# Patient Record
Sex: FEMALE | Race: WHITE | Employment: OTHER | URBAN - NONMETROPOLITAN AREA
[De-identification: names, ages, dates, MRNs, and addresses within clinical notes are randomized per-mention and may not be internally consistent; named-entity substitution may affect disease eponyms.]

---

## 2020-04-21 ENCOUNTER — APPOINTMENT (OUTPATIENT)
Dept: CT IMAGING | Age: 65
DRG: 092 | End: 2020-04-21
Payer: MEDICARE

## 2020-04-21 ENCOUNTER — APPOINTMENT (OUTPATIENT)
Dept: MRI IMAGING | Age: 65
DRG: 092 | End: 2020-04-21
Payer: MEDICARE

## 2020-04-21 ENCOUNTER — APPOINTMENT (OUTPATIENT)
Dept: GENERAL RADIOLOGY | Age: 65
DRG: 092 | End: 2020-04-21
Payer: MEDICARE

## 2020-04-21 ENCOUNTER — HOSPITAL ENCOUNTER (INPATIENT)
Age: 65
LOS: 1 days | Discharge: HOME | DRG: 092 | End: 2020-04-22
Attending: EMERGENCY MEDICINE | Admitting: HOSPITALIST
Payer: MEDICARE

## 2020-04-21 PROBLEM — R47.01 APHASIA: Status: ACTIVE | Noted: 2020-04-21

## 2020-04-21 PROBLEM — R03.0 BLOOD PRESSURE ELEVATED WITHOUT HISTORY OF HTN: Status: ACTIVE | Noted: 2020-04-21

## 2020-04-21 LAB
ALBUMIN SERPL-MCNC: 4.5 G/DL (ref 3.5–5.2)
ALP BLD-CCNC: 56 U/L (ref 35–104)
ALT SERPL-CCNC: 20 U/L (ref 5–33)
AMPHETAMINE SCREEN, URINE: NEGATIVE
ANION GAP SERPL CALCULATED.3IONS-SCNC: 15 MMOL/L (ref 7–19)
APTT: 29 SEC (ref 26–36.2)
AST SERPL-CCNC: 27 U/L (ref 5–32)
BARBITURATE SCREEN URINE: NEGATIVE
BASOPHILS ABSOLUTE: 0 K/UL (ref 0–0.2)
BASOPHILS RELATIVE PERCENT: 0.3 % (ref 0–1)
BENZODIAZEPINE SCREEN, URINE: NEGATIVE
BILIRUB SERPL-MCNC: 0.3 MG/DL (ref 0.2–1.2)
BILIRUBIN URINE: NEGATIVE
BLOOD, URINE: NEGATIVE
BUN BLDV-MCNC: 7 MG/DL (ref 8–23)
CALCIUM SERPL-MCNC: 9.4 MG/DL (ref 8.8–10.2)
CANNABINOID SCREEN URINE: NEGATIVE
CHLORIDE BLD-SCNC: 99 MMOL/L (ref 98–111)
CHOLESTEROL, TOTAL: 217 MG/DL (ref 160–199)
CLARITY: CLEAR
CO2: 23 MMOL/L (ref 22–29)
COCAINE METABOLITE SCREEN URINE: NEGATIVE
COLOR: YELLOW
CREAT SERPL-MCNC: 0.5 MG/DL (ref 0.5–0.9)
EKG P AXIS: 72 DEGREES
EKG P-R INTERVAL: 128 MS
EKG Q-T INTERVAL: 366 MS
EKG QRS DURATION: 88 MS
EKG QTC CALCULATION (BAZETT): 408 MS
EKG T AXIS: 66 DEGREES
EOSINOPHILS ABSOLUTE: 0 K/UL (ref 0–0.6)
EOSINOPHILS RELATIVE PERCENT: 0.2 % (ref 0–5)
ETHANOL: <10 MG/DL (ref 0–0.08)
GFR NON-AFRICAN AMERICAN: >60
GLUCOSE BLD-MCNC: 125 MG/DL (ref 74–109)
GLUCOSE URINE: NEGATIVE MG/DL
HBA1C MFR BLD: 5.4 % (ref 4–6)
HCT VFR BLD CALC: 37.8 % (ref 37–47)
HDLC SERPL-MCNC: 96 MG/DL (ref 65–121)
HEMOGLOBIN: 12.6 G/DL (ref 12–16)
IMMATURE GRANULOCYTES #: 0 K/UL
INR BLD: 0.95 (ref 0.88–1.18)
KETONES, URINE: NEGATIVE MG/DL
LDL CHOLESTEROL CALCULATED: 115 MG/DL
LEUKOCYTE ESTERASE, URINE: NEGATIVE
LYMPHOCYTES ABSOLUTE: 0.9 K/UL (ref 1.1–4.5)
LYMPHOCYTES RELATIVE PERCENT: 14 % (ref 20–40)
Lab: NORMAL
MCH RBC QN AUTO: 31.5 PG (ref 27–31)
MCHC RBC AUTO-ENTMCNC: 33.3 G/DL (ref 33–37)
MCV RBC AUTO: 94.5 FL (ref 81–99)
MONOCYTES ABSOLUTE: 0.4 K/UL (ref 0–0.9)
MONOCYTES RELATIVE PERCENT: 6.6 % (ref 0–10)
NEUTROPHILS ABSOLUTE: 4.8 K/UL (ref 1.5–7.5)
NEUTROPHILS RELATIVE PERCENT: 78.6 % (ref 50–65)
NITRITE, URINE: NEGATIVE
OPIATE SCREEN URINE: NEGATIVE
PDW BLD-RTO: 13.5 % (ref 11.5–14.5)
PH UA: 6.5 (ref 5–8)
PLATELET # BLD: 265 K/UL (ref 130–400)
PMV BLD AUTO: 9 FL (ref 9.4–12.3)
POTASSIUM REFLEX MAGNESIUM: 3.7 MMOL/L (ref 3.5–5)
PROTEIN UA: NEGATIVE MG/DL
PROTHROMBIN TIME: 12.6 SEC (ref 12–14.6)
RBC # BLD: 4 M/UL (ref 4.2–5.4)
SEDIMENTATION RATE, ERYTHROCYTE: 13 MM/HR (ref 0–25)
SODIUM BLD-SCNC: 137 MMOL/L (ref 136–145)
SPECIFIC GRAVITY UA: 1 (ref 1–1.03)
TOTAL PROTEIN: 7.7 G/DL (ref 6.6–8.7)
TRIGL SERPL-MCNC: 30 MG/DL (ref 0–149)
TROPONIN: <0.01 NG/ML (ref 0–0.03)
TSH SERPL DL<=0.05 MIU/L-ACNC: 2.86 UIU/ML (ref 0.27–4.2)
URINE REFLEX TO CULTURE: NORMAL
UROBILINOGEN, URINE: 0.2 E.U./DL
WBC # BLD: 6.1 K/UL (ref 4.8–10.8)

## 2020-04-21 PROCEDURE — 80061 LIPID PANEL: CPT

## 2020-04-21 PROCEDURE — G0480 DRUG TEST DEF 1-7 CLASSES: HCPCS

## 2020-04-21 PROCEDURE — 99999 PR OFFICE/OUTPT VISIT,PROCEDURE ONLY: CPT | Performed by: EMERGENCY MEDICINE

## 2020-04-21 PROCEDURE — 85730 THROMBOPLASTIN TIME PARTIAL: CPT

## 2020-04-21 PROCEDURE — 84484 ASSAY OF TROPONIN QUANT: CPT

## 2020-04-21 PROCEDURE — 6370000000 HC RX 637 (ALT 250 FOR IP): Performed by: PHYSICIAN ASSISTANT

## 2020-04-21 PROCEDURE — 99223 1ST HOSP IP/OBS HIGH 75: CPT | Performed by: PSYCHIATRY & NEUROLOGY

## 2020-04-21 PROCEDURE — 85610 PROTHROMBIN TIME: CPT

## 2020-04-21 PROCEDURE — 85025 COMPLETE CBC W/AUTO DIFF WBC: CPT

## 2020-04-21 PROCEDURE — 83036 HEMOGLOBIN GLYCOSYLATED A1C: CPT

## 2020-04-21 PROCEDURE — 99285 EMERGENCY DEPT VISIT HI MDM: CPT

## 2020-04-21 PROCEDURE — 81003 URINALYSIS AUTO W/O SCOPE: CPT

## 2020-04-21 PROCEDURE — 71045 X-RAY EXAM CHEST 1 VIEW: CPT

## 2020-04-21 PROCEDURE — 36415 COLL VENOUS BLD VENIPUNCTURE: CPT

## 2020-04-21 PROCEDURE — 1210000000 HC MED SURG R&B

## 2020-04-21 PROCEDURE — 93005 ELECTROCARDIOGRAM TRACING: CPT | Performed by: HOSPITALIST

## 2020-04-21 PROCEDURE — 80307 DRUG TEST PRSMV CHEM ANLYZR: CPT

## 2020-04-21 PROCEDURE — 80053 COMPREHEN METABOLIC PANEL: CPT

## 2020-04-21 PROCEDURE — 70496 CT ANGIOGRAPHY HEAD: CPT

## 2020-04-21 PROCEDURE — 70498 CT ANGIOGRAPHY NECK: CPT

## 2020-04-21 PROCEDURE — 95816 EEG AWAKE AND DROWSY: CPT | Performed by: PSYCHIATRY & NEUROLOGY

## 2020-04-21 PROCEDURE — 70450 CT HEAD/BRAIN W/O DYE: CPT

## 2020-04-21 PROCEDURE — 2580000003 HC RX 258: Performed by: PHYSICIAN ASSISTANT

## 2020-04-21 PROCEDURE — 93010 ELECTROCARDIOGRAM REPORT: CPT | Performed by: INTERNAL MEDICINE

## 2020-04-21 PROCEDURE — 84443 ASSAY THYROID STIM HORMONE: CPT

## 2020-04-21 PROCEDURE — 6360000004 HC RX CONTRAST MEDICATION: Performed by: PSYCHIATRY & NEUROLOGY

## 2020-04-21 PROCEDURE — 70551 MRI BRAIN STEM W/O DYE: CPT

## 2020-04-21 PROCEDURE — 85652 RBC SED RATE AUTOMATED: CPT

## 2020-04-21 PROCEDURE — 95816 EEG AWAKE AND DROWSY: CPT

## 2020-04-21 RX ORDER — ONDANSETRON 2 MG/ML
4 INJECTION INTRAMUSCULAR; INTRAVENOUS EVERY 6 HOURS PRN
Status: DISCONTINUED | OUTPATIENT
Start: 2020-04-21 | End: 2020-04-22 | Stop reason: HOSPADM

## 2020-04-21 RX ORDER — SODIUM CHLORIDE 0.9 % (FLUSH) 0.9 %
10 SYRINGE (ML) INJECTION PRN
Status: DISCONTINUED | OUTPATIENT
Start: 2020-04-21 | End: 2020-04-22 | Stop reason: HOSPADM

## 2020-04-21 RX ORDER — ASPIRIN 300 MG/1
300 SUPPOSITORY RECTAL DAILY
Status: DISCONTINUED | OUTPATIENT
Start: 2020-04-21 | End: 2020-04-22 | Stop reason: HOSPADM

## 2020-04-21 RX ORDER — SODIUM CHLORIDE 0.9 % (FLUSH) 0.9 %
10 SYRINGE (ML) INJECTION EVERY 12 HOURS SCHEDULED
Status: DISCONTINUED | OUTPATIENT
Start: 2020-04-21 | End: 2020-04-22 | Stop reason: HOSPADM

## 2020-04-21 RX ORDER — POLYETHYLENE GLYCOL 3350 17 G/17G
17 POWDER, FOR SOLUTION ORAL DAILY PRN
Status: DISCONTINUED | OUTPATIENT
Start: 2020-04-21 | End: 2020-04-22 | Stop reason: HOSPADM

## 2020-04-21 RX ORDER — OMEGA-3S/DHA/EPA/FISH OIL/D3 300MG-1000
400 CAPSULE ORAL DAILY
COMMUNITY

## 2020-04-21 RX ORDER — ASCORBIC ACID 500 MG
500 TABLET ORAL DAILY
COMMUNITY

## 2020-04-21 RX ORDER — ATORVASTATIN CALCIUM 40 MG/1
40 TABLET, FILM COATED ORAL NIGHTLY
Status: DISCONTINUED | OUTPATIENT
Start: 2020-04-21 | End: 2020-04-22 | Stop reason: HOSPADM

## 2020-04-21 RX ORDER — THIAMINE HYDROCHLORIDE 100 MG/ML
100 INJECTION, SOLUTION INTRAMUSCULAR; INTRAVENOUS DAILY
Status: DISCONTINUED | OUTPATIENT
Start: 2020-04-21 | End: 2020-04-22 | Stop reason: HOSPADM

## 2020-04-21 RX ORDER — CETIRIZINE HYDROCHLORIDE 10 MG/1
10 TABLET ORAL DAILY
COMMUNITY

## 2020-04-21 RX ORDER — ASPIRIN 81 MG/1
81 TABLET ORAL DAILY
Status: DISCONTINUED | OUTPATIENT
Start: 2020-04-21 | End: 2020-04-22 | Stop reason: HOSPADM

## 2020-04-21 RX ORDER — PROMETHAZINE HYDROCHLORIDE 12.5 MG/1
12.5 TABLET ORAL EVERY 6 HOURS PRN
Status: DISCONTINUED | OUTPATIENT
Start: 2020-04-21 | End: 2020-04-22 | Stop reason: HOSPADM

## 2020-04-21 RX ORDER — LABETALOL HYDROCHLORIDE 5 MG/ML
10 INJECTION, SOLUTION INTRAVENOUS EVERY 10 MIN PRN
Status: DISCONTINUED | OUTPATIENT
Start: 2020-04-21 | End: 2020-04-22 | Stop reason: HOSPADM

## 2020-04-21 RX ADMIN — SODIUM CHLORIDE, PRESERVATIVE FREE 10 ML: 5 INJECTION INTRAVENOUS at 10:50

## 2020-04-21 RX ADMIN — ATORVASTATIN CALCIUM 40 MG: 40 TABLET, FILM COATED ORAL at 21:20

## 2020-04-21 RX ADMIN — IOPAMIDOL 90 ML: 755 INJECTION, SOLUTION INTRAVENOUS at 11:28

## 2020-04-21 RX ADMIN — SODIUM CHLORIDE, PRESERVATIVE FREE 10 ML: 5 INJECTION INTRAVENOUS at 21:20

## 2020-04-21 ASSESSMENT — ENCOUNTER SYMPTOMS
PHOTOPHOBIA: 0
COUGH: 0
SHORTNESS OF BREATH: 0
BACK PAIN: 0
NAUSEA: 0
VOMITING: 0

## 2020-04-21 ASSESSMENT — PAIN SCALES - GENERAL: PAINLEVEL_OUTOF10: 0

## 2020-04-21 ASSESSMENT — PAIN SCALES - WONG BAKER: WONGBAKER_NUMERICALRESPONSE: 0

## 2020-04-21 NOTE — PROGRESS NOTES
The patient is off the floor for an MRI. Will return when the patient is available.         Electronically Signed By:  Jason Fox M.S., CCC-SLP  4/21/2020,10:26 AM.

## 2020-04-21 NOTE — CONSULTS
normal; no masses,  no organomegaly  Extremities: extremities normal, atraumatic, no cyanosis or edema      NEUROLOGIC EXAMINATION:  Neurologic Exam     Mental Status   Disoriented to city. Disoriented to year and date. Oriented to month. Speech: (Very quiet and pressured.)  Level of consciousness: alert  Able to name object. Able to repeat. She has rather bizarre behavior. She says some odd things. She says I am trying to confuse her. She is able to answer questions and follow simple commands but does so slowly and sometimes has to be asked twice. Cranial Nerves     CN II   Visual fields full to confrontation. CN III, IV, VI   Pupils are equal, round, and reactive to light. Extraocular motions are normal.   Nystagmus: none     CN VII   Facial expression full, symmetric.      CN VIII   Hearing: intact    CN IX, X   Palate: symmetric    CN XI   Right sternocleidomastoid strength: normal  Left sternocleidomastoid strength: normal  Right trapezius strength: normal  Left trapezius strength: normal    CN XII   Tongue: not atrophic  Fasciculations: absent  Tongue deviation: none    Motor Exam   Muscle bulk: normal  Overall muscle tone: normal  Right arm pronator drift: absent  Left arm pronator drift: absent    Strength   Right neck flexion: 5/5  Left neck flexion: 5/5  Right neck extension: 5/5  Left neck extension: 5/5  Right deltoid: 5/5  Left deltoid: 5/5  Right biceps: 5/5  Left biceps: 5/5  Right triceps: 5/5  Left triceps: 5/5  Right wrist flexion: 5/5  Left wrist flexion: 5/5  Right wrist extension: 5/5  Left wrist extension: 5/5  Right interossei: 5/5  Left interossei: 5/5  Right iliopsoas: 5/5  Left iliopsoas: 5/5  Right quadriceps: 5/5  Left quadriceps: 5/5  Right glutei: 5/5  Left glutei: 5/5  Right anterior tibial: 5/5  Left anterior tibial: 5/5  Right posterior tibial: 5/5  Left posterior tibial: 5/5  Right peroneal: 5/5  Left peroneal: 5/5  Right gastroc: 5/5  Left gastroc: 5/5    Sensory imaging the brain is performed without   contrast.   COMPARISON: CT head 4/21/2020   FINDINGS: There is no abnormal diffusion restriction to indicate acute   ischemia. No intracranial hemorrhage identified. Punctate   calcifications considered of the basal ganglia. The ventricles are   normal in size and configuration. There are no abnormal intra-axial   signal changes. No evidence of demyelinating process. No abnormal   extra-axial fluid collection. The cerebellar tonsils are positioned at   the foramen magnum. No sellar mass identified. Corpus callosum appears   intact. Limited assessment of the orbits and base of skull is unremarkable. Chronic mucosal thickening noted of the ethmoid sinuses. Normal flow   voids in the distal internal carotid and basilar arteries. Appropriate   flow void seen in the sagittal sinus.       Impression   1d. No acute intracranial abnormality. No diffusion restriction to   indicate acute ischemia. 2. Mild chronic mucosal thickening of the ethmoid sinuses     Narrative   XR CHEST PORTABLE 4/21/2020 6:00 AM   HISTORY: Altered mental status   COMPARISON: None. CXR: A single frontal view of the chest is performed. Findings: There are linear densities the right midlung along the course of the   minor fissure favorable for atelectasis and/or scarring. There is no   dense parenchymal consolidation. The cardia mediastinal contours are   normal. No pleural effusion or pneumothorax. No acute bony pathology.       Impression   1. Linear right midlung densities may relate to atelectasis and/or   scarring versus pleural thickening of the minor fissure. No dense   consolidation or radiographic evidence of edema. .   Signed by Dr Mango Luna on 4/21/2020 7:33 AM       IMPRESSION:  1. Encephalopathy, unusual, consider post infectious, or viral encephalitis. Her confusion, speech pattern. .. suggest luciano but no history of that and would be very odd to start at age 71 especially un precipitated. 2. Recent pneumonia      PLAN:  1. Labs  2. EEG  3. Hold Lovenox  [facility]  5. Anticipate LP tomorrow  6.  CTA head and neck, echocardiogram    Reid Da Silva M.D.

## 2020-04-21 NOTE — ED PROVIDER NOTES
status: None    Intimate partner violence     Fear of current or ex partner: None     Emotionally abused: None     Physically abused: None     Forced sexual activity: None   Other Topics Concern    None   Social History Narrative    None       SCREENINGS    Nelson Coma Scale  Eye Opening: Spontaneous  Best Verbal Response: Confused  Best Motor Response: Obeys commands  Queta Coma Scale Score: 14        PHYSICAL EXAM    (up to 7 for level 4, 8 or more for level 5)     ED Triage Vitals [04/21/20 0636]   BP Temp Temp src Pulse Resp SpO2 Height Weight   (!) 173/76 98.1 °F (36.7 °C) -- 84 20 93 % 5' 8\" (1.727 m) 200 lb (90.7 kg)       Physical Exam  Vitals signs and nursing note reviewed. Constitutional:       General: She is not in acute distress. Appearance: She is well-developed. She is not toxic-appearing or diaphoretic. HENT:      Head: Normocephalic and atraumatic. Eyes:      General: No scleral icterus. Right eye: No discharge. Left eye: No discharge. Pupils: Pupils are equal, round, and reactive to light. Neck:      Musculoskeletal: Normal range of motion. Cardiovascular:      Rate and Rhythm: Normal rate and regular rhythm. Pulses: Normal pulses. Heart sounds: Normal heart sounds. Pulmonary:      Effort: Pulmonary effort is normal. No respiratory distress. Breath sounds: Normal breath sounds. No stridor. No wheezing or rhonchi. Abdominal:      General: There is no distension. Musculoskeletal: Normal range of motion. General: No deformity. Right lower leg: No edema. Left lower leg: No edema. Skin:     General: Skin is warm and dry. Neurological:      General: No focal deficit present. Mental Status: She is alert. GCS: GCS eye subscore is 4. GCS verbal subscore is 4. GCS motor subscore is 6. Cranial Nerves: Cranial nerves are intact. No cranial nerve deficit. Sensory: Sensation is intact.       Motor: Motor 7 (*)     All other components within normal limits   TROPONIN   PROTIME-INR   APTT   URINE RT REFLEX TO CULTURE   ETHANOL   URINE DRUG SCREEN       All other labs were within normal range or not returned as of this dictation. Medications - No data to display    [address] [place] [address] and DIFFERENTIALDIAGNOSIS/MDM:   Vitals:    Vitals:    04/21/20 0636 04/21/20 0732 04/21/20 0803 04/21/20 0833   BP: (!) 173/76 (!) 153/79 136/80 (!) 141/75   Pulse: 84 87 76 76   Resp: 20 12 17 18   Temp: 98.1 °F (36.7 °C)      SpO2: 93% 100% 100% 99%   Weight: 200 lb (90.7 kg)      Height: 5' 8\" (1.727 m)          MDM    ED Course as of Apr 21 0910   Tue Apr 21, 2020   0736  Outside hospital records received. Her outside hospital HPI, patient presented there with cough and subjective fever and shortness of breath. Patient felt woozy when she got up and then struck her head against the wall and fell against the toilet at that time. Patient was found to have pneumonia on chest x-ray and had mycoplasma pneumonia on respiratory viral panel. CT of the head results on 3/14 show no acute intracranial abnormalities. Patient was tested for novel coronavirus at that time which was negative. Patient was discharged on doxycycline and Levaquin. [APOLINAR]   1731 CT here shows no acute intracranial findings. MRI of the brain ordered. Discussed with neurology who is in agreement with plan at this time and agrees to see patient in consultation. [APOLINAR]      ED Course User Index  [APOLINAR] Bigg Austin MD   Based on the evaluation and work-up here patient is felt to require further monitoring, work-up, or treatment that is available in the emergency department. Case was discussed with hospitalist who agrees for observation or admission for further management. Treatment and stabilization as necessary were provided in the emergency department prior to transfer of care to the medicine service.         CONSULTS:  IP CONSULT TO NEUROLOGY    PROCEDURES:  Unless otherwise notedbelow, none     Procedures      FINAL IMPRESSION     1. Aphasia    2.  History of mycoplasma pneumonia          DISPOSITION/PLAN   DISPOSITION        PATIENT REFERRED TO:  Marlon Hughes MD  [place] [id number]  [phone]            DISCHARGE MEDICATIONS:  New Prescriptions    No medications on file          (Please note that portions of this note were completed with a voice recognition program.  Efforts were made to edit the dictations butoccasionally words are mis-transcribed.)    Bigg Dixon MD (electronically signed)  AttendingEmerBaptist Health Medical Center Physician          Bigg Dixon., MD  04/21/20 1907

## 2020-04-21 NOTE — ED NOTES
Patient placed in a gown Patient placed on cardiac monitor, continuous pulse oximeter, and NIBP monitor.  Monitor alarms on.       Chuck Jackson RN  04/21/20 1129

## 2020-04-21 NOTE — PROGRESS NOTES
Occupational Therapy    OT and PT attempted to perform pt. Evaluations this PM.  Pt declined saying we were trying to [employer]. Will attempt evals tomorrow.   Electronically signed by Lauren Joy OT on 4/21/2020 at 1:34 PM

## 2020-04-21 NOTE — ED NOTES
Called the hospitalist with no answer, and voalted Dr. Geoff Kirkpatrick at [phone] for [address] [place] [address].       Chelsea  04/21/20 8844

## 2020-04-22 VITALS
RESPIRATION RATE: 20 BRPM | SYSTOLIC BLOOD PRESSURE: 112 MMHG | HEART RATE: 83 BPM | DIASTOLIC BLOOD PRESSURE: 76 MMHG | WEIGHT: 200 LBS | TEMPERATURE: 98.5 F | OXYGEN SATURATION: 99 % | BODY MASS INDEX: 30.31 KG/M2 | HEIGHT: 68 IN

## 2020-04-22 LAB
ANION GAP SERPL CALCULATED.3IONS-SCNC: 16 MMOL/L (ref 7–19)
BUN BLDV-MCNC: 6 MG/DL (ref 8–23)
CALCIUM SERPL-MCNC: 9.4 MG/DL (ref 8.8–10.2)
CHLORIDE BLD-SCNC: 102 MMOL/L (ref 98–111)
CO2: 22 MMOL/L (ref 22–29)
CREAT SERPL-MCNC: 0.5 MG/DL (ref 0.5–0.9)
FOLATE: >20 NG/ML (ref 4.8–37.3)
GFR NON-AFRICAN AMERICAN: >60
GLUCOSE BLD-MCNC: 98 MG/DL (ref 74–109)
HCT VFR BLD CALC: 37.6 % (ref 37–47)
HEMOGLOBIN: 12.8 G/DL (ref 12–16)
LV EF: 55 %
LVEF MODALITY: NORMAL
MAGNESIUM: 2.3 MG/DL (ref 1.6–2.4)
MCH RBC QN AUTO: 31.1 PG (ref 27–31)
MCHC RBC AUTO-ENTMCNC: 34 G/DL (ref 33–37)
MCV RBC AUTO: 91.5 FL (ref 81–99)
PDW BLD-RTO: 13.6 % (ref 11.5–14.5)
PLATELET # BLD: 291 K/UL (ref 130–400)
PMV BLD AUTO: 8.8 FL (ref 9.4–12.3)
POTASSIUM REFLEX MAGNESIUM: 3.5 MMOL/L (ref 3.5–5)
RBC # BLD: 4.11 M/UL (ref 4.2–5.4)
SODIUM BLD-SCNC: 140 MMOL/L (ref 136–145)
T4 FREE: 1.62 NG/DL (ref 0.93–1.7)
VITAMIN B-12: 918 PG/ML (ref 211–946)
WBC # BLD: 4.7 K/UL (ref 4.8–10.8)

## 2020-04-22 PROCEDURE — 92610 EVALUATE SWALLOWING FUNCTION: CPT

## 2020-04-22 PROCEDURE — 82746 ASSAY OF FOLIC ACID SERUM: CPT

## 2020-04-22 PROCEDURE — 85027 COMPLETE CBC AUTOMATED: CPT

## 2020-04-22 PROCEDURE — 6360000002 HC RX W HCPCS: Performed by: PSYCHIATRY & NEUROLOGY

## 2020-04-22 PROCEDURE — 6370000000 HC RX 637 (ALT 250 FOR IP): Performed by: PHYSICIAN ASSISTANT

## 2020-04-22 PROCEDURE — 36415 COLL VENOUS BLD VENIPUNCTURE: CPT

## 2020-04-22 PROCEDURE — 80048 BASIC METABOLIC PNL TOTAL CA: CPT

## 2020-04-22 PROCEDURE — 82607 VITAMIN B-12: CPT

## 2020-04-22 PROCEDURE — 99233 SBSQ HOSP IP/OBS HIGH 50: CPT | Performed by: PSYCHIATRY & NEUROLOGY

## 2020-04-22 PROCEDURE — 86800 THYROGLOBULIN ANTIBODY: CPT

## 2020-04-22 PROCEDURE — 86038 ANTINUCLEAR ANTIBODIES: CPT

## 2020-04-22 PROCEDURE — 84439 ASSAY OF FREE THYROXINE: CPT

## 2020-04-22 PROCEDURE — 2580000003 HC RX 258: Performed by: PHYSICIAN ASSISTANT

## 2020-04-22 PROCEDURE — 92523 SPEECH SOUND LANG COMPREHEN: CPT

## 2020-04-22 PROCEDURE — 86376 MICROSOMAL ANTIBODY EACH: CPT

## 2020-04-22 PROCEDURE — 83735 ASSAY OF MAGNESIUM: CPT

## 2020-04-22 PROCEDURE — 93306 TTE W/DOPPLER COMPLETE: CPT

## 2020-04-22 PROCEDURE — 97161 PT EVAL LOW COMPLEX 20 MIN: CPT

## 2020-04-22 RX ORDER — ATORVASTATIN CALCIUM 40 MG/1
40 TABLET, FILM COATED ORAL NIGHTLY
Qty: 30 TABLET | Refills: 3 | Status: SHIPPED | OUTPATIENT
Start: 2020-04-22 | End: 2020-04-22

## 2020-04-22 RX ORDER — ATORVASTATIN CALCIUM 40 MG/1
40 TABLET, FILM COATED ORAL NIGHTLY
Qty: 30 TABLET | Refills: 3 | Status: SHIPPED | OUTPATIENT
Start: 2020-04-22

## 2020-04-22 RX ADMIN — SODIUM CHLORIDE, PRESERVATIVE FREE 10 ML: 5 INJECTION INTRAVENOUS at 11:19

## 2020-04-22 RX ADMIN — THIAMINE HYDROCHLORIDE 100 MG: 100 INJECTION, SOLUTION INTRAMUSCULAR; INTRAVENOUS at 11:18

## 2020-04-22 RX ADMIN — ASPIRIN 81 MG: 81 TABLET, COATED ORAL at 11:18

## 2020-04-22 ASSESSMENT — PAIN SCALES - GENERAL: PAINLEVEL_OUTOF10: 0

## 2020-04-22 NOTE — PROGRESS NOTES
Physical Therapy    Facility/Department: [facility] SURG SERVICES  Initial Assessment    NAME: Jeny Snowden  : 1955  MRN: [id number]    Date of Service: 2020    Discharge Recommendations:  Continue to assess pending progress        Assessment   Assessment: Patient is a 71year old female who is from [facility] but has been in [place] the last 2 months. In the first week of MArch patient experienced acute vertigo and syncope and fell and hit her head on the toilet. On 3/10 she developed a cough, dyspnea and fever and was diagnosed with viral pneumonia and d/c from the hospital on 3/17. She was headed back to [place] [address] with her friend and she drobe on  and . They stayed in [place] and her symptoms worsened. Prior to hosptilization patient was independent within the community. PT attempted to eval patient on  but was confused, today patient is coherent and following mutistep commands. Patient was able to perfrom all functional mobility and stand to comb her hair and eloy cleaning after using the restroom. At this time patient does not require PT services  PT Education: PT Role  REQUIRES PT FOLLOW UP: No       Patient Diagnosis(es): The primary encounter diagnosis was Aphasia. A diagnosis of History of mycoplasma pneumonia was also pertinent to this visit. has a past medical history of Pneumonia. has no past surgical history on file.     Restrictions     Vision/Hearing  Vision: Impaired  Vision Exceptions: Wears glasses at all times  Hearing: Exceptions to [facility]  Hearing Exceptions: Hard of hearing/hearing concerns     Subjective  General  Chart Reviewed: Yes  Patient assessed for rehabilitation services?: Yes  Diagnosis: aphasia  Follows Commands: Within Functional Limits  Subjective  Subjective: upon entry patient was supine in bed and agreed to PT eval  Pain Screening  Patient Currently in Pain: Denies  Vital Signs  Level of Consciousness: Alert  Patient Currently in Pain: Denies Orientation  Orientation  Overall Orientation Status: Within Normal Limits  Social/Functional History  Social/Functional History  Lives With: Friend(s)  Type of Home: House  Home Layout: Multi-level  Home Access: Stairs to enter without rails  Entrance Stairs - Number of Steps: 1  Bathroom Shower/Tub: Tub/Shower unit, Walk-in shower  Bathroom Toilet: Handicap height  ADL Assistance: Independent  Homemaking Assistance: Independent  Ambulation Assistance: Independent  Transfer Assistance: Independent  Active : Yes  Mode of Transportation: Car  Cognition   Cognition  Overall Cognitive Status: WFL    Objective          AROM RLE (degrees)  RLE AROM: WFL  AROM LLE (degrees)  LLE AROM : WFL  Strength RLE  Strength RLE: WFL  Strength LLE  Strength LLE: WFL  Motor Control  Gross Motor?: WFL  Sensation  Overall Sensation Status: WFL  Bed mobility  Bridging: Independent  Rolling to Left: Independent  Rolling to Right: Independent  Supine to Sit: Independent  Sit to Supine: Independent  Scooting: Independent  Transfers  Sit to Stand: Independent  Stand to sit:  Independent  Bed to Chair: Independent  Ambulation  Ambulation?: Yes  Ambulation 1  Surface: level tile  Device: No Device  Assistance: Independent  Gait Deviations: None  Comments: Patient was able to ambulate within her room with no AD and no LOB     Balance  Posture: Good  Sitting - Static: Good  Sitting - Dynamic: Good  Standing - Static: Good        Plan   Safety Devices  Type of devices: (Patient left with transfer employee)    G-Code       OutComes Score                                                  AM-PAC Score             Goals          Therapy Time   Individual Concurrent Group Co-treatment   Time In           Time Out           Minutes                   Kenia Espinosa PT

## 2020-04-22 NOTE — DISCHARGE SUMMARY
atorvastatin 40 MG tablet  Commonly known as:  LIPITOR  Take 1 tablet by mouth nightly        CONTINUE taking these medications    cetirizine 10 MG tablet  Commonly known as:  ZYRTEC     vitamin C 500 MG tablet  Commonly known as:  ASCORBIC ACID     vitamin D3 10 MCG (400 UNIT) Tabs tablet  Commonly known as:  CHOLECALCIFEROL           Where to Get Your Medications      These medications were sent to [facility], [address]  [address] , [address]    Phone:  [phone]   · atorvastatin 40 MG tablet         Electronically signed by Og Bhatti MD on 4/22/20 at 11:02 AM CDT

## 2020-04-23 LAB
THYROGLOBULIN AB: <0.9 IU/ML (ref 0–4)
THYROID PEROXIDASE (TPO) ABS: 2.1 IU/ML (ref 0–9)

## 2020-04-24 LAB — ANA IGG, ELISA: NORMAL

## 2022-06-30 NOTE — PROGRESS NOTES
Dr. Brown, patient is under the impression the appointment is to be scheduled for July 1st. Appointment was scheduled.    Thank you,  DEYANIRA CastleR       partner: Not on file     Emotionally abused: Not on file     Physically abused: Not on file     Forced sexual activity: Not on file   Other Topics Concern    Not on file   Social History Narrative    Not on file       Medications:     sodium chloride flush  10 mL Intravenous 2 times per day    atorvastatin  40 mg Oral Nightly    aspirin  81 mg Oral Daily    Or    aspirin  300 mg Rectal Daily    thiamine  100 mg Intravenous Daily       Diagnostic Studies:  Reviewed all labs/diagnositcs since last 24hrs:  Recent Results (from the past 24 hour(s))   Sedimentation Rate    Collection Time: 04/21/20 11:54 AM   Result Value Ref Range    Sed Rate 13 0 - 25 mm/Hr   Lipid Panel    Collection Time: 04/21/20 11:54 AM   Result Value Ref Range    Cholesterol, Total 217 (H) 160 - 199 mg/dL    Triglycerides 30 0 - 149 mg/dL    HDL 96 65 - 121 mg/dL    LDL Calculated 115 <100 mg/dL   Hemoglobin A1C    Collection Time: 04/21/20 11:54 AM   Result Value Ref Range    Hemoglobin A1C 5.4 4.0 - 6.0 %   TSH without Reflex    Collection Time: 04/21/20 11:54 AM   Result Value Ref Range    TSH 2.860 0.270 - 4.200 uIU/mL   Basic Metabolic Panel w/ Reflex to MG    Collection Time: 04/22/20  2:31 AM   Result Value Ref Range    Sodium 140 136 - 145 mmol/L    Potassium reflex Magnesium 3.5 3.5 - 5.0 mmol/L    Chloride 102 98 - 111 mmol/L    CO2 22 22 - 29 mmol/L    Anion Gap 16 7 - 19 mmol/L    Glucose 98 74 - 109 mg/dL    BUN 6 (L) 8 - 23 mg/dL    CREATININE 0.5 0.5 - 0.9 mg/dL    GFR Non-African American >60 >60    Calcium 9.4 8.8 - 10.2 mg/dL   CBC    Collection Time: 04/22/20  2:31 AM   Result Value Ref Range    WBC 4.7 (L) 4.8 - 10.8 K/uL    RBC 4.11 (L) 4.20 - 5.40 M/uL    Hemoglobin 12.8 12.0 - 16.0 g/dL    Hematocrit 37.6 37.0 - 47.0 %    MCV 91.5 81.0 - 99.0 fL    MCH 31.1 (H) 27.0 - 31.0 pg    MCHC 34.0 33.0 - 37.0 g/dL    RDW 13.6 11.5 - 14.5 %    Platelets 441 763 - 595 K/uL    MPV 8.8 (L) 9.4 - 12.3 fL   T4, FREE    Collection Time: 04/22/20  2:31 AM   Result Value Ref Range    T4 Free 1.62 0.93 - 1.70 ng/dL   Vitamin B12    Collection Time: 04/22/20  2:31 AM   Result Value Ref Range    Vitamin B-12 918 211 - 946 pg/mL   Folate    Collection Time: 04/22/20  2:31 AM   Result Value Ref Range    Folate >20.0 4.8 - 37.3 ng/mL   Magnesium    Collection Time: 04/22/20  2:31 AM   Result Value Ref Range    Magnesium 2.3 1.6 - 2.4 mg/dL       Diet:  DIET GENERAL;    Examination:  Vitals: /84   Pulse 78   Temp 98 °F (36.7 °C) (Temporal)   Resp 20   Ht 5' 8\" (1.727 m)   Wt 200 lb (90.7 kg)   SpO2 97%   BMI 30.41 kg/m²  No intake or output data in the 24 hours ending 04/22/20 1015  General appearance: alert and cooperative with exam  HEENT: Exam; heent: Head: Normal, normocephalic, atraumatic. Lungs: clear to auscultation bilaterally  Heart: regular rate and rhythm, S1, S2 normal, no murmur, click, rub or gallop  Abdomen: soft, non-tender; bowel sounds normal; no masses,  no organomegaly  Extremities: extremities normal, atraumatic, no cyanosis or edema  Neurologic:  Alert, oriented. No dysarthria. Speech is fluent. EOMI, PERRL, VFF. No facial weakness. Limb strength is normal.  No pronator drift. Rapid alternating movements are normal.  Finger to nose testing shows no dysmetria. Assessment:   1. Encephalopathy, unusual, consider post infectious, or viral encephalitis. Her confusion, speech pattern. .. suggest luciano but no history of that and would be very odd to start at age 71 especially un precipitated. She has resolved. 2.         Recent pneumonia. Records from [place] reviewed. She did have a positive mycoplasma Ur ag. Mycoplasma pneumonias can cause parainfectious neurologic symptoms. If that is the etiology, it is expected to resolve within 8 weeks of pneumonia. Plan:   1. No need for LP at this point. 2. OK neurologically for discharge. FU with her PCP at her home in a week.        Discharge